# Patient Record
Sex: FEMALE | Race: WHITE | NOT HISPANIC OR LATINO | URBAN - METROPOLITAN AREA
[De-identification: names, ages, dates, MRNs, and addresses within clinical notes are randomized per-mention and may not be internally consistent; named-entity substitution may affect disease eponyms.]

---

## 2021-04-08 ENCOUNTER — OFFICE VISIT (OUTPATIENT)
Dept: URBAN - METROPOLITAN AREA CLINIC 78 | Facility: CLINIC | Age: 31
End: 2021-04-08
Payer: COMMERCIAL

## 2021-04-08 VITALS
SYSTOLIC BLOOD PRESSURE: 127 MMHG | BODY MASS INDEX: 43.72 KG/M2 | WEIGHT: 262.4 LBS | TEMPERATURE: 97.3 F | HEIGHT: 65 IN | DIASTOLIC BLOOD PRESSURE: 87 MMHG | HEART RATE: 79 BPM

## 2021-04-08 DIAGNOSIS — R19.7 DIARRHEA OF PRESUMED INFECTIOUS ORIGIN: ICD-10-CM

## 2021-04-08 DIAGNOSIS — R14.3 EXCESSIVE FLATUS: ICD-10-CM

## 2021-04-08 DIAGNOSIS — R10.11 RUQ PAIN: ICD-10-CM

## 2021-04-08 DIAGNOSIS — Z83.79 FAMILY HISTORY OF ULCERATIVE COLITIS: ICD-10-CM

## 2021-04-08 DIAGNOSIS — R14.0 BLOATING: ICD-10-CM

## 2021-04-08 PROCEDURE — G9903 PT SCRN TBCO ID AS NON USER: HCPCS | Performed by: INTERNAL MEDICINE

## 2021-04-08 PROCEDURE — 99214 OFFICE O/P EST MOD 30 MIN: CPT | Performed by: INTERNAL MEDICINE

## 2021-04-08 PROCEDURE — G8417 CALC BMI ABV UP PARAM F/U: HCPCS | Performed by: INTERNAL MEDICINE

## 2021-04-08 PROCEDURE — G9622 NO UNHEAL ETOH USER: HCPCS | Performed by: INTERNAL MEDICINE

## 2021-04-08 PROCEDURE — 3017F COLORECTAL CA SCREEN DOC REV: CPT | Performed by: INTERNAL MEDICINE

## 2021-04-08 PROCEDURE — 1036F TOBACCO NON-USER: CPT | Performed by: INTERNAL MEDICINE

## 2021-04-08 PROCEDURE — G8427 DOCREV CUR MEDS BY ELIG CLIN: HCPCS | Performed by: INTERNAL MEDICINE

## 2021-04-08 RX ORDER — MEDROXYPROGESTERONE ACETATE 10 MG/1
TABLET ORAL
Qty: 0 | Refills: 0 | Status: ACTIVE | COMMUNITY
Start: 1900-01-01

## 2021-04-08 NOTE — HPI-TODAY'S VISIT:
31-year-old  female presents for 1 month history of right upper quadrant pain/cramping, belching, excessive flatus, diarrhea 5-10 times per day, intermittent small amount of blood, postprandial, BSs type VII, and nausea and weight loss of 5 pounds.  Work-up done with the primary care provider, unremarkable but leukocytosis.  Pantoprazole and hyoscyamine started 3 days ago.  Hyoscyamine helps but short burst only.  She has been on Ozempic since February.  She has history of lactose intolerance, had colonoscopy at age of 16.  She is a nurse.

## 2021-04-08 NOTE — PREVIOUS WORKUP REVIEWED
:LABS-Labs 04/06/2021: WBC 14.3, hemoglobin 15.9, plt 303, glucose 88, BUN 9, creatinine 0.73, total protein 8.2, albumin 4.5, total bilirubin 0.9, alkaline phosphatase 97, AST 20, ALT 27, hemoglobin A1c 5.0, TSH 2.0, free T4 1.26, lipase 28.

## 2021-04-08 NOTE — HPI-TODAY'S VISIT:
Patient was referred by Dr. Kelly Tsnag for abdominal pain. A copy of this document will be sent to the physician.

## 2021-04-15 ENCOUNTER — TELEPHONE ENCOUNTER (OUTPATIENT)
Dept: URBAN - METROPOLITAN AREA CLINIC 23 | Facility: CLINIC | Age: 31
End: 2021-04-15

## 2021-04-18 ENCOUNTER — TELEPHONE ENCOUNTER (OUTPATIENT)
Dept: URBAN - METROPOLITAN AREA CLINIC 23 | Facility: CLINIC | Age: 31
End: 2021-04-18

## 2021-04-18 RX ORDER — METRONIDAZOLE 500 MG/1
1 TABLET TABLET, FILM COATED ORAL THREE TIMES A DAY
Qty: 30 | OUTPATIENT
Start: 2021-04-18 | End: 2021-04-28

## 2021-04-19 LAB — GASTROINTESTINAL PATHOGEN: (no result)

## 2021-04-21 ENCOUNTER — LAB OUTSIDE AN ENCOUNTER (OUTPATIENT)
Dept: URBAN - METROPOLITAN AREA CLINIC 78 | Facility: CLINIC | Age: 31
End: 2021-04-21

## 2022-09-22 ENCOUNTER — WEB ENCOUNTER (OUTPATIENT)
Dept: URBAN - METROPOLITAN AREA CLINIC 80 | Facility: CLINIC | Age: 32
End: 2022-09-22

## 2022-09-27 ENCOUNTER — OFFICE VISIT (OUTPATIENT)
Dept: URBAN - METROPOLITAN AREA CLINIC 80 | Facility: CLINIC | Age: 32
End: 2022-09-27
Payer: COMMERCIAL

## 2022-09-27 ENCOUNTER — DASHBOARD ENCOUNTERS (OUTPATIENT)
Age: 32
End: 2022-09-27

## 2022-09-27 VITALS
WEIGHT: 274.4 LBS | TEMPERATURE: 97 F | DIASTOLIC BLOOD PRESSURE: 102 MMHG | HEART RATE: 92 BPM | BODY MASS INDEX: 45.72 KG/M2 | HEIGHT: 65 IN | SYSTOLIC BLOOD PRESSURE: 147 MMHG

## 2022-09-27 DIAGNOSIS — K58.0 IRRITABLE BOWEL SYNDROME WITH DIARRHEA: ICD-10-CM

## 2022-09-27 DIAGNOSIS — R10.9 ABDOMINAL CRAMPING: ICD-10-CM

## 2022-09-27 DIAGNOSIS — R10.11 RUQ PAIN: ICD-10-CM

## 2022-09-27 DIAGNOSIS — R19.7 DIARRHEA, UNSPECIFIED TYPE: ICD-10-CM

## 2022-09-27 DIAGNOSIS — R14.0 BLOATING: ICD-10-CM

## 2022-09-27 PROBLEM — 197125005: Status: ACTIVE | Noted: 2022-09-27

## 2022-09-27 PROCEDURE — 99213 OFFICE O/P EST LOW 20 MIN: CPT | Performed by: PHYSICIAN ASSISTANT

## 2022-09-27 RX ORDER — HYOSCYAMINE SULFATE 0.12 MG/1
1 TABLET UNDER THE TONGUE AND ALLOW TO DISSOLVE  AS NEEDED TABLET, ORALLY DISINTEGRATING ORAL
Qty: 90 | Refills: 6 | OUTPATIENT
Start: 2022-09-27 | End: 2023-04-25

## 2022-09-27 RX ORDER — MEDROXYPROGESTERONE ACETATE 10 MG/1
TABLET ORAL
Qty: 0 | Refills: 0 | Status: ACTIVE | COMMUNITY
Start: 1900-01-01

## 2022-09-27 RX ORDER — RIFAXIMIN 550 MG/1
1 TABLET TABLET ORAL THREE TIMES A DAY
Qty: 42 TABLET | Refills: 3 | OUTPATIENT
Start: 2022-09-27 | End: 2022-11-21

## 2022-09-27 NOTE — HPI-TODAY'S VISIT:
She is wanting to get started with IVF again and her doctor wants to put her on Metformin Issue is anything she eats she has an upset stomach - has been for the past 1 1/2 years She has diarrhea soon after eating and if they eat out she can barely make it home She is having 5+ bm a day No BRBPR or melena Always loose and watery abd cramping before BM and relieved with the BM Rare nausea, no emesis - more car sick than anything else No family hx IBD, colon ca or polyps No change when she came off Ozempic in the past she had a colonoscopy when she was 16 - told she had IBS she does notice it worse with greasy foods, but recently it is with anythig she eats Also increased bloating and gas

## 2022-09-29 ENCOUNTER — P2P PATIENT RECORD (OUTPATIENT)
Age: 32
End: 2022-09-29

## 2022-10-12 ENCOUNTER — ERX REFILL RESPONSE (OUTPATIENT)
Dept: URBAN - METROPOLITAN AREA CLINIC 80 | Facility: CLINIC | Age: 32
End: 2022-10-12

## 2022-10-12 RX ORDER — HYOSCYAMINE SULFATE 0.12 MG/1
1 TABLET UNDER THE TONGUE AND ALLOW TO DISSOLVE AS NEEDED SUBLINGUAL BEFORE EACH MEAL 30 DAYS TABLET ORAL; SUBLINGUAL
Qty: 270 TABLET | Refills: 3 | OUTPATIENT

## 2022-10-12 RX ORDER — HYOSCYAMINE SULFATE 0.12 MG/1
1 TABLET UNDER THE TONGUE AND ALLOW TO DISSOLVE  AS NEEDED TABLET, ORALLY DISINTEGRATING ORAL
Qty: 90 | Refills: 6 | OUTPATIENT

## 2025-05-13 ENCOUNTER — HOSPITAL ENCOUNTER (EMERGENCY)
Facility: HOSPITAL | Age: 35
Discharge: HOME | End: 2025-05-13
Attending: EMERGENCY MEDICINE | Admitting: EMERGENCY MEDICINE
Payer: COMMERCIAL

## 2025-05-13 VITALS
WEIGHT: 222.13 LBS | TEMPERATURE: 99 F | DIASTOLIC BLOOD PRESSURE: 92 MMHG | RESPIRATION RATE: 17 BRPM | HEART RATE: 87 BPM | OXYGEN SATURATION: 100 % | SYSTOLIC BLOOD PRESSURE: 143 MMHG

## 2025-05-13 DIAGNOSIS — O46.90 VAGINAL BLEEDING IN PREGNANCY: Primary | ICD-10-CM

## 2025-05-13 DIAGNOSIS — N93.9 VAGINAL BLEEDING: ICD-10-CM

## 2025-05-13 LAB
ABORH RETYPE: NORMAL
ABSOLUTE EOSINOPHIL (OHS): 0.14 K/UL
ABSOLUTE MONOCYTE (OHS): 0.5 K/UL (ref 0.3–1)
ABSOLUTE NEUTROPHIL COUNT (OHS): 5.73 K/UL (ref 1.8–7.7)
ALBUMIN SERPL BCP-MCNC: 3.2 G/DL (ref 3.5–5.2)
ALP SERPL-CCNC: 85 UNIT/L (ref 40–150)
ALT SERPL W/O P-5'-P-CCNC: 18 UNIT/L (ref 10–44)
ANION GAP (OHS): 10 MMOL/L (ref 8–16)
AST SERPL-CCNC: 17 UNIT/L (ref 11–45)
BASOPHILS # BLD AUTO: 0.03 K/UL
BASOPHILS NFR BLD AUTO: 0.3 %
BILIRUB SERPL-MCNC: 1.2 MG/DL (ref 0.1–1)
BILIRUB UR QL STRIP.AUTO: NEGATIVE
BUN SERPL-MCNC: 9 MG/DL (ref 6–20)
CALCIUM SERPL-MCNC: 9 MG/DL (ref 8.7–10.5)
CHLORIDE SERPL-SCNC: 107 MMOL/L (ref 95–110)
CLARITY UR: CLEAR
CO2 SERPL-SCNC: 21 MMOL/L (ref 23–29)
COLOR UR AUTO: YELLOW
CREAT SERPL-MCNC: 0.7 MG/DL (ref 0.5–1.4)
ERYTHROCYTE [DISTWIDTH] IN BLOOD BY AUTOMATED COUNT: 12.7 % (ref 11.5–14.5)
GFR SERPLBLD CREATININE-BSD FMLA CKD-EPI: >60 ML/MIN/1.73/M2
GLUCOSE SERPL-MCNC: 94 MG/DL (ref 70–110)
GLUCOSE UR QL STRIP: NEGATIVE
HCT VFR BLD AUTO: 40.9 % (ref 37–48.5)
HGB BLD-MCNC: 13.8 GM/DL (ref 12–16)
HGB UR QL STRIP: ABNORMAL
HOLD SPECIMEN: NORMAL
IMM GRANULOCYTES # BLD AUTO: 0.02 K/UL (ref 0–0.04)
IMM GRANULOCYTES NFR BLD AUTO: 0.2 % (ref 0–0.5)
INDIRECT COOMBS: NORMAL
KETONES UR QL STRIP: ABNORMAL
LEUKOCYTE ESTERASE UR QL STRIP: NEGATIVE
LYMPHOCYTES # BLD AUTO: 2.79 K/UL (ref 1–4.8)
MCH RBC QN AUTO: 28.5 PG (ref 27–31)
MCHC RBC AUTO-ENTMCNC: 33.7 G/DL (ref 32–36)
MCV RBC AUTO: 85 FL (ref 82–98)
NITRITE UR QL STRIP: NEGATIVE
NUCLEATED RBC (/100WBC) (OHS): 0 /100 WBC
PH UR STRIP: 7 [PH]
PLATELET # BLD AUTO: 272 K/UL (ref 150–450)
PMV BLD AUTO: 9.8 FL (ref 9.2–12.9)
POTASSIUM SERPL-SCNC: 4.2 MMOL/L (ref 3.5–5.1)
PROT SERPL-MCNC: 7.2 GM/DL (ref 6–8.4)
PROT UR QL STRIP: NEGATIVE
RBC # BLD AUTO: 4.84 M/UL (ref 4–5.4)
RELATIVE EOSINOPHIL (OHS): 1.5 %
RELATIVE LYMPHOCYTE (OHS): 30.3 % (ref 18–48)
RELATIVE MONOCYTE (OHS): 5.4 % (ref 4–15)
RELATIVE NEUTROPHIL (OHS): 62.3 % (ref 38–73)
RH BLD: NORMAL
RH IMMUNE GLOBULIN: NORMAL
SODIUM SERPL-SCNC: 138 MMOL/L (ref 136–145)
SP GR UR STRIP: 1.02
SPECIMEN OUTDATE: NORMAL
UROBILINOGEN UR STRIP-ACNC: NEGATIVE EU/DL
WBC # BLD AUTO: 9.21 K/UL (ref 3.9–12.7)

## 2025-05-13 PROCEDURE — 84460 ALANINE AMINO (ALT) (SGPT): CPT | Performed by: EMERGENCY MEDICINE

## 2025-05-13 PROCEDURE — 63600519 RHOGAM PHARM REV CODE 636 ALT 250 W HCPCS: Performed by: REGISTERED NURSE

## 2025-05-13 PROCEDURE — 85025 COMPLETE CBC W/AUTO DIFF WBC: CPT | Performed by: EMERGENCY MEDICINE

## 2025-05-13 PROCEDURE — 99284 EMERGENCY DEPT VISIT MOD MDM: CPT | Mod: 25

## 2025-05-13 PROCEDURE — 96372 THER/PROPH/DIAG INJ SC/IM: CPT | Performed by: REGISTERED NURSE

## 2025-05-13 PROCEDURE — 81003 URINALYSIS AUTO W/O SCOPE: CPT | Performed by: EMERGENCY MEDICINE

## 2025-05-13 PROCEDURE — 86900 BLOOD TYPING SEROLOGIC ABO: CPT | Performed by: REGISTERED NURSE

## 2025-05-13 RX ORDER — NAPROXEN SODIUM 220 MG/1
81 TABLET, FILM COATED ORAL DAILY
COMMUNITY

## 2025-05-13 RX ORDER — ESTRADIOL 2 MG/1
2 TABLET ORAL 2 TIMES DAILY
COMMUNITY

## 2025-05-13 RX ORDER — ENOXAPARIN SODIUM 150 MG/ML
40 INJECTION SUBCUTANEOUS NIGHTLY
COMMUNITY

## 2025-05-13 RX ADMIN — HUMAN RHO(D) IMMUNE GLOBULIN 300 MCG: 300 INJECTION, SOLUTION INTRAMUSCULAR at 08:05

## 2025-05-13 NOTE — ED PROVIDER NOTES
"SCRIBE #1 NOTE: I, Juju Bouchra, am scribing for, and in the presence of, Jesus Talbert MD. I have scribed the entire note.       History     Chief Complaint   Patient presents with    Vaginal Bleeding     Pt is 6 weeks pregnant after IVF. Had vaguinal ultrasound yesterday in [place] then travelled to [place]. Last night filled half a pad with bright blood and a few clots. Today is brown and minimal. IVF dr was contacted by pt who advised she come for rogam shot. Pt denies pain. On lovenox as part of ivf process.      Review of patient's allergies indicates:  No Known Allergies      History of Present Illness     HPI    5/13/2025, 5:21 PM***  History obtained from the {Historian:55928::"patient","medical records"}      History of Present Illness: Jennyfer Barraza is a 35 y.o. female patient with {PMHx:80951::"a PMHx of"} *** who presents to the Emergency Department for evaluation of *** which began ***. Symptoms are constant*** and moderate*** in severity. No mitigating or exacerbating factors reported***. Associated sxs include ***. Patient denies any *** or ***. {Prior Tx:12514} No further complaints or concerns at this time.       Arrival mode: {Transportation list:40703}    PCP: No, Primary Doctor        Past Medical History:  History reviewed. No pertinent past medical history.    Past Surgical History:  History reviewed. No pertinent surgical history.      Family History:  No family history on file.    Social History:  Social History     Tobacco Use    Smoking status: Never    Smokeless tobacco: Never   Substance and Sexual Activity    Alcohol use: Not on file    Drug use: Not on file    Sexual activity: Not on file        Review of Systems     Review of Systems  ***   Physical Exam     Initial Vitals [05/13/25 1352]   BP Pulse Resp Temp SpO2   (!) 153/87 85 16 98 °F (36.7 °C) 98 %      MAP       --          Physical Exam  Nursing Notes and Vital Signs Reviewed.  Constitutional: Patient is in " "{DISTRESS LEVEL:84040}. {Nourishment:74542::"Well-developed and well-nourished"}.  Head: Atraumatic***. Normocephalic***.  Eyes: PERRL.*** EOM intact. Conjunctivae are not pale.*** No scleral icterus.***  ENT: Mucous membranes are {MM:28674}. Oropharynx is clear*** and symmetric***.    Neck: Supple.*** Full ROM.*** No lymphadenopathy.***  Cardiovascular: Regular rate***. Regular rhythm***. No murmurs, rubs, or gallops.*** Distal pulses are 2+ and symmetric***.  Pulmonary/Chest: No*** respiratory distress. Clear to auscultation bilaterally***. No wheezing*** or rales.***  Abdominal: Soft and non-distended.***  There is no tenderness***.  No rebound, guarding, or rigidity. Good bowel sounds.  Genitourinary: No*** CVA tenderness  Musculoskeletal: Moves all extremities.*** No obvious deformities***. No edema***. No calf tenderness***.  Skin: Warm and dry.***  Neurological:  Alert***, awake***, and appropriate***.  Normal speech***.  No acute focal neurological deficits are appreciated.***  Psychiatric: Normal affect.*** Good eye contact. Appropriate*** in content.     ED Course   Procedures  ED Vital Signs:  Vitals:    05/13/25 1352 05/13/25 1400 05/13/25 1415 05/13/25 1430   BP: (!) 153/87 (!) 141/86 116/82 117/82   Pulse: 85 91 82 82   Resp: 16      Temp: 98 °F (36.7 °C)      TempSrc: Oral      SpO2: 98% 98% 98% 98%   Weight: 100.8 kg (222 lb 1.8 oz)       05/13/25 1712   BP: (!) 154/104   Pulse: 86   Resp: 16   Temp: 98.5 °F (36.9 °C)   TempSrc: Oral   SpO2: 100%   Weight:        Abnormal Lab Results:  Labs Reviewed   URINALYSIS, REFLEX TO URINE CULTURE - Abnormal       Result Value    Color, UA Yellow      Appearance, UA Clear      pH, UA 7.0      Spec Grav UA 1.020      Protein, UA Negative      Glucose, UA Negative      Ketones, UA Trace (*)     Bilirubin, UA Negative      Blood, UA Trace (*)     Nitrites, UA Negative      Urobilinogen, UA Negative      Leukocyte Esterase, UA Negative     COMPREHENSIVE METABOLIC " PANEL - Abnormal    Sodium 138      Potassium 4.2      Chloride 107      CO2 21 (*)     Glucose 94      BUN 9      Creatinine 0.7      Calcium 9.0      Protein Total 7.2      Albumin 3.2 (*)     Bilirubin Total 1.2 (*)     ALP 85      AST 17      ALT 18      Anion Gap 10      eGFR >60     CBC WITH DIFFERENTIAL - Normal    WBC 9.21      RBC 4.84      HGB 13.8      HCT 40.9      MCV 85      MCH 28.5      MCHC 33.7      RDW 12.7      Platelet Count 272      MPV 9.8      Nucleated RBC 0      Neut % 62.3      Lymph % 30.3      Mono % 5.4      Eos % 1.5      Basophil % 0.3      Imm Grans % 0.2      Neut # 5.73      Lymph # 2.79      Mono # 0.50      Eos # 0.14      Baso # 0.03      Imm Grans # 0.02     CBC W/ AUTO DIFFERENTIAL    Narrative:     The following orders were created for panel order CBC auto differential.  Procedure                               Abnormality         Status                     ---------                               -----------         ------                     CBC with Differential[[id number]]       Normal              Final result                 Please view results for these tests on the individual orders.   GREY TOP URINE HOLD    Extra Tube Hold for add-ons.     PREPARE RH IMMUNE GLOBULIN        All Lab Results:  {LABS TODAY:60083}    Imaging Results:  Imaging Results    None          The EKG was ordered, reviewed, and independently interpreted by the ED provider.  Interpretation time: ***  Rate: *** BPM  Rhythm: {rhythm:18606}  Interpretation: ***. No STEMI.  When compared to EKG performed ***, there are no significant changes.           The Emergency Provider reviewed the vital signs and test results, which are outlined above.     ED Discussion     {PLAN:79883}       Medical Decision Making  Amount and/or Complexity of Data Reviewed  Labs: ordered.                ED Medication(s):  Medications - No data to display    New Prescriptions    No medications on file        Follow-up Information        [facility]. Go today.    Contact information:  [address] [facility] - Emergency Dept.    Specialty: Emergency Medicine  Contact information:  [address]  Emergency Department  [facility] [address] [phone]                               Scribe Attestation:   Scribe #1: I performed the above scribed service and the documentation accurately describes the services I performed. I attest to the accuracy of the note.     Attending:   Physician Attestation Statement for Scribe #1: I, Jesus Talbert MD, personally performed the services described in this documentation, as scribed by Juju Shook, in my presence, and it is both accurate and complete.           Clinical Impression       ICD-10-CM ICD-9-CM   1. Vaginal bleeding in pregnancy  O46.90 641.93        ***

## 2025-05-13 NOTE — ED NOTES
Patient in Ed c/o vaginal bleeding (bright red with clots on yesterday day). Spotting today per patient.  Patient states she gets IVIF and takes Lovenox shots daily. Last dose was at 19:00 hours on yesterday.      Sent here by IVIF Physician for a Rhogam injection.      5 weeks and six days pregnant.

## 2025-05-13 NOTE — ED PROVIDER NOTES
SCRIBE #1 NOTE: I, Juju Shook, am scribing for, and in the presence of, Jesus Talbert MD. I have scribed the entire note.       History     Chief Complaint   Patient presents with    Vaginal Bleeding     Pt is 6 weeks pregnant after IVF. Had vaguinal ultrasound yesterday in [place] then travelled to [place]. Last night filled half a pad with bright blood and a few clots. Today is brown and minimal. IVF dr was contacted by pt who advised she come for rogam shot. Pt denies pain. On lovenox as part of ivf process.      Review of patient's allergies indicates:  No Known Allergies      History of Present Illness     HPI    5/13/2025, 6:54 PM  History obtained from the patient, medical records, and mother      History of Present Illness: Jennyfer Barraza is a 35 y.o. female patient with no previous PMHx reported who presents to the Emergency Department at the instruction of her IVF specialist to receive RhoGAM injection. Her mother is at bedside assisting in providing pt history. Patient was discharged from [facility] ED earlier today after evaluation for vaginal bleeding. Patient is currently pregnant after multiple rounds of IVF. She is not in any pain or discomfort at this time. Prior Tx includes Lovenox as part of her IVF treatment. No further complaints or concerns at this time.       Arrival mode: Personal Transportation    PCP: Lulu, Primary Doctor        Past Medical History:  History reviewed. No pertinent past medical history.    Past Surgical History:  History reviewed. No pertinent surgical history.      Family History:  No family history on file.    Social History:  Social History     Tobacco Use    Smoking status: Never    Smokeless tobacco: Never   Substance and Sexual Activity    Alcohol use: Not on file    Drug use: Not on file    Sexual activity: Not on file        Review of Systems     Review of Systems   Constitutional:  Negative for fever.   HENT:  Negative for sore throat.    Respiratory:   Negative for shortness of breath.    Cardiovascular:  Negative for chest pain.   Gastrointestinal:  Negative for abdominal pain and nausea.   Genitourinary:  Positive for vaginal bleeding (improved). Negative for dysuria and vaginal pain.   Musculoskeletal:  Negative for back pain.   Skin:  Negative for rash.   Neurological:  Negative for weakness.   Hematological:  Does not bruise/bleed easily.   All other systems reviewed and are negative.     Physical Exam     Initial Vitals [05/13/25 1352]   BP Pulse Resp Temp SpO2   (!) 153/87 85 16 98 °F (36.7 °C) 98 %      MAP       --          Physical Exam  Nursing Notes and Vital Signs Reviewed.  Constitutional: Patient is in no acute distress. Well-developed and well-nourished.  Head: Atraumatic. Normocephalic.  Eyes: PERRL. EOM intact. Conjunctivae are not pale. No scleral icterus.  ENT: Mucous membranes are moist. Oropharynx is clear and symmetric.    Neck: Supple. Full ROM. No lymphadenopathy.  Cardiovascular: Regular rate. Regular rhythm. No murmurs, rubs, or gallops. Distal pulses are 2+ and symmetric.  Pulmonary/Chest: No respiratory distress. Clear to auscultation bilaterally. No wheezing or rales.  Abdominal: Soft and non-distended.  There is no tenderness.  No rebound, guarding, or rigidity. Good bowel sounds.  Genitourinary: No CVA tenderness  Musculoskeletal: Moves all extremities. No obvious deformities. No edema. No calf tenderness.  Skin: Warm and dry.  Neurological:  Alert, awake, and appropriate.  Normal speech.  No acute focal neurological deficits are appreciated.  Psychiatric: Normal affect. Good eye contact. Appropriate in content.     ED Course   Procedures  ED Vital Signs:  Vitals:    05/13/25 1352 05/13/25 1400 05/13/25 1415 05/13/25 1430   BP: (!) 153/87 (!) 141/86 116/82 117/82   Pulse: 85 91 82 82   Resp: 16      Temp: 98 °F (36.7 °C)      TempSrc: Oral      SpO2: 98% 98% 98% 98%   Weight: 100.8 kg (222 lb 1.8 oz)       05/13/25 1712 05/13/25  1815 05/13/25 1830 05/13/25 1850   BP: (!) 154/104 (!) 141/92 (!) 135/90 (!) 136/98   Pulse: 86 90 86 80   Resp: 16 20 18 18   Temp: 98.5 °F (36.9 °C)      TempSrc: Oral      SpO2: 100% 100% 100% 100%   Weight:        05/13/25 1900   BP: (!) 134/96   Pulse: 91   Resp: 20   Temp: 98.5 °F (36.9 °C)   TempSrc: Oral   SpO2: 100%   Weight:        Abnormal Lab Results:  Labs Reviewed   URINALYSIS, REFLEX TO URINE CULTURE - Abnormal       Result Value    Color, UA Yellow      Appearance, UA Clear      pH, UA 7.0      Spec Grav UA 1.020      Protein, UA Negative      Glucose, UA Negative      Ketones, UA Trace (*)     Bilirubin, UA Negative      Blood, UA Trace (*)     Nitrites, UA Negative      Urobilinogen, UA Negative      Leukocyte Esterase, UA Negative     COMPREHENSIVE METABOLIC PANEL - Abnormal    Sodium 138      Potassium 4.2      Chloride 107      CO2 21 (*)     Glucose 94      BUN 9      Creatinine 0.7      Calcium 9.0      Protein Total 7.2      Albumin 3.2 (*)     Bilirubin Total 1.2 (*)     ALP 85      AST 17      ALT 18      Anion Gap 10      eGFR >60     CBC WITH DIFFERENTIAL - Normal    WBC 9.21      RBC 4.84      HGB 13.8      HCT 40.9      MCV 85      MCH 28.5      MCHC 33.7      RDW 12.7      Platelet Count 272      MPV 9.8      Nucleated RBC 0      Neut % 62.3      Lymph % 30.3      Mono % 5.4      Eos % 1.5      Basophil % 0.3      Imm Grans % 0.2      Neut # 5.73      Lymph # 2.79      Mono # 0.50      Eos # 0.14      Baso # 0.03      Imm Grans # 0.02     CBC W/ AUTO DIFFERENTIAL    Narrative:     The following orders were created for panel order CBC auto differential.  Procedure                               Abnormality         Status                     ---------                               -----------         ------                     CBC with Differential[[id number]]       Normal              Final result                 Please view results for these tests on the individual orders.   GREY TOP  URINE HOLD    Extra Tube Hold for add-ons.     TYPE & SCREEN    Specimen Outdate 05/16/2025 23:59      Group & Rh A NEG      Indirect Paty NEG     ABORH RETYPE    ABORH Retype A NEG     PREPARE RH IMMUNE GLOBULIN    Rh Immune Fili [id number] Issued          All Lab Results:  Results for orders placed or performed during the hospital encounter of 05/13/25   Prepare Rh Immune Globulin    Collection Time: 05/13/25  2:47 PM   Result Value Ref Range    Rh Immune Susygulin [id number] Issued    Comprehensive metabolic panel    Collection Time: 05/13/25  3:07 PM   Result Value Ref Range    Sodium 138 136 - 145 mmol/L    Potassium 4.2 3.5 - 5.1 mmol/L    Chloride 107 95 - 110 mmol/L    CO2 21 (L) 23 - 29 mmol/L    Glucose 94 70 - 110 mg/dL    BUN 9 6 - 20 mg/dL    Creatinine 0.7 0.5 - 1.4 mg/dL    Calcium 9.0 8.7 - 10.5 mg/dL    Protein Total 7.2 6.0 - 8.4 gm/dL    Albumin 3.2 (L) 3.5 - 5.2 g/dL    Bilirubin Total 1.2 (H) 0.1 - 1.0 mg/dL    ALP 85 40 - 150 unit/L    AST 17 11 - 45 unit/L    ALT 18 10 - 44 unit/L    Anion Gap 10 8 - 16 mmol/L    eGFR >60 >60 mL/min/1.73/m2   CBC with Differential    Collection Time: 05/13/25  3:07 PM   Result Value Ref Range    WBC 9.21 3.90 - 12.70 K/uL    RBC 4.84 4.00 - 5.40 M/uL    HGB 13.8 12.0 - 16.0 gm/dL    HCT 40.9 37.0 - 48.5 %    MCV 85 82 - 98 fL    MCH 28.5 27.0 - 31.0 pg    MCHC 33.7 32.0 - 36.0 g/dL    RDW 12.7 11.5 - 14.5 %    Platelet Count 272 150 - 450 K/uL    MPV 9.8 9.2 - 12.9 fL    Nucleated RBC 0 <=0 /100 WBC    Neut % 62.3 38 - 73 %    Lymph % 30.3 18 - 48 %    Mono % 5.4 4 - 15 %    Eos % 1.5 <=8 %    Basophil % 0.3 <=1.9 %    Imm Grans % 0.2 0.0 - 0.5 %    Neut # 5.73 1.8 - 7.7 K/uL    Lymph # 2.79 1 - 4.8 K/uL    Mono # 0.50 0.3 - 1 K/uL    Eos # 0.14 <=0.5 K/uL    Baso # 0.03 <=0.2 K/uL    Imm Grans # 0.02 0.00 - 0.04 K/uL   Urinalysis, Reflex to Urine Culture Urine, Clean Catch    Collection Time: 05/13/25  3:11 PM    Specimen: Urine   Result Value Ref Range     Color, UA Yellow Straw, Rosita, Yellow, Light-Orange    Appearance, UA Clear Clear    pH, UA 7.0 5.0 - 8.0    Spec Grav UA 1.020 1.005 - 1.030    Protein, UA Negative Negative    Glucose, UA Negative Negative    Ketones, UA Trace (A) Negative    Bilirubin, UA Negative Negative    Blood, UA Trace (A) Negative    Nitrites, UA Negative Negative    Urobilinogen, UA Negative <2.0 EU/dL    Leukocyte Esterase, UA Negative Negative   GREY TOP URINE HOLD    Collection Time: 05/13/25  3:11 PM   Result Value Ref Range    Extra Tube Hold for add-ons.    ABORH RETYPE    Collection Time: 05/13/25  6:15 PM   Result Value Ref Range    ABORH Retype A NEG    Type & Screen    Collection Time: 05/13/25  6:19 PM   Result Value Ref Range    Specimen Outdate 05/16/2025 23:59     Group & Rh A NEG     Indirect Paty NEG        Imaging Results:  Imaging Results    None              The Emergency Provider reviewed the vital signs and test results, which are outlined above.     ED Discussion     8:09 PM: Reassessed pt at this time. Discussed with patient and/or family/caretaker all pertinent ED information and results. Discussed pt dx and plan of tx. Gave the patient and family all f/u and return to the ED instructions. All questions and concerns were addressed at this time. Patient and/or family/caretaker expresses understanding of information and instructions, and is comfortable with plan to discharge. Pt is stable for discharge.     I discussed with patient and/or family/caretaker that evaluation in the ED does not suggest any emergent or life threatening medical conditions requiring immediate intervention beyond what was provided in the ED, and I believe patient is safe for discharge.  Regardless, an unremarkable evaluation in the ED does not preclude the development or presence of a serious of life threatening condition. As such, I instructed that the patient is to return immediately for any worsening or change in current symptoms.        Medical Decision Making  Amount and/or Complexity of Data Reviewed  Labs: ordered. Decision-making details documented in ED Course.                ED Medication(s):  Medications   rho(D) immune globulin (RhoGAM/HyperRHO) IM injection (300 mcg Intramuscular Given 5/13/25 2028)       Current Discharge Medication List           Follow-up Information       [facility]. Go today.    Contact information:  [address]             [facility] - Emergency Dept.    Specialty: Emergency Medicine  Contact information:  [address]  Emergency Department  [facility] [address] [phone]                               Scribe Attestation:   Scribe #1: I performed the above scribed service and the documentation accurately describes the services I performed. I attest to the accuracy of the note.     Attending:   Physician Attestation Statement for Scribe #1: I, Jesus Talbert MD, personally performed the services described in this documentation, as scribed by Juju Shook, in my presence, and it is both accurate and complete.           Clinical Impression       ICD-10-CM ICD-9-CM   1. Vaginal bleeding in pregnancy  O46.90 641.93   2. Vaginal bleeding  N93.9 623.8       Disposition:   Disposition: Discharged  Condition: Stable

## 2025-05-13 NOTE — ED NOTES
Bed: Int 24  Expected date:   Expected time:   Means of arrival:   Comments:   Thalidomide Pregnancy And Lactation Text: This medication is Pregnancy Category X and is absolutely contraindicated during pregnancy. It is unknown if it is excreted in breast milk.

## 2025-05-13 NOTE — ED NOTES
Patient traveling by Private Vehicle to [facility] for an ED to ED transfer.  Dr. Arriaga has accepted patient, and she is in route.

## 2025-05-13 NOTE — ED PROVIDER NOTES
Encounter Date: 5/13/2025       History     Chief Complaint   Patient presents with    Vaginal Bleeding     Pt is 6 weeks pregnant after IVF. Had vaguinal ultrasound yesterday in [place] then travelled to [place]. Last night filled half a pad with bright blood and a few clots. Today is brown and minimal. IVF  was contacted by pt who advised she come for rogam shot. Pt denies pain. On lovenox as part of ivf process.      36yo F here in [facility] who is 5wga and had some vaginal bleeding/spotting yesterday after vaginal u/s in [place] and pt states she's rh negative but  is rh positive. pt requesting rhogam shot and ok with going ed to ed to get it. spotting has improved and pt denies any pain. We do not have rhogam here at this facility.    The history is provided by the patient.   Vaginal Bleeding  This is a new problem. The current episode started yesterday. The problem occurs rarely. The problem has been resolved. Pertinent negatives include no chest pain, no abdominal pain, no headaches and no shortness of breath. Nothing (pt ahd vaginal u/s yesterday and on lovenox) aggravates the symptoms. She has tried nothing for the symptoms. The treatment provided significant relief.     Review of patient's allergies indicates:  No Known Allergies  History reviewed. No pertinent past medical history.  History reviewed. No pertinent surgical history.  No family history on file.  Social History[1]  Review of Systems   Constitutional:  Negative for fever.   HENT:  Negative for sore throat.    Respiratory:  Negative for shortness of breath.    Cardiovascular:  Negative for chest pain.   Gastrointestinal:  Negative for abdominal pain and nausea.   Genitourinary:  Positive for vaginal bleeding. Negative for dysuria.   Musculoskeletal:  Negative for back pain.   Skin:  Negative for rash.   Neurological:  Negative for weakness and headaches.   Hematological:  Does not bruise/bleed easily.   All other systems reviewed  and are negative.      Physical Exam     Initial Vitals [05/13/25 1352]   BP Pulse Resp Temp SpO2   (!) 153/87 85 16 98 °F (36.7 °C) 98 %      MAP       --         Physical Exam    Nursing note and vitals reviewed.  Constitutional: She appears well-developed and well-nourished.   HENT:   Head: Normocephalic and atraumatic. Mouth/Throat: No oropharyngeal exudate.   Eyes: Conjunctivae and EOM are normal. Pupils are equal, round, and reactive to light.   Neck: Neck supple. No thyromegaly present.   Normal range of motion.  Cardiovascular:  Normal rate, regular rhythm, normal heart sounds and intact distal pulses.     Exam reveals no gallop and no friction rub.       No murmur heard.  Pulmonary/Chest: Breath sounds normal. No respiratory distress. She has no wheezes. She has no rhonchi. She exhibits no tenderness.   Abdominal: Abdomen is soft. Bowel sounds are normal. She exhibits no distension. There is no abdominal tenderness. There is no rebound and no guarding.   Genitourinary:    Genitourinary Comments: Pt declined vaginal exam at this point in time and states bleeding has resolved.     Musculoskeletal:         General: No tenderness or edema. Normal range of motion.      Cervical back: Normal range of motion and neck supple.     Lymphadenopathy:     She has no cervical adenopathy.   Neurological: She is alert and oriented to person, place, and time. She has normal strength. No cranial nerve deficit or sensory deficit.   Skin: Skin is warm and dry. Capillary refill takes less than 2 seconds. No rash noted.   Psychiatric: She has a normal mood and affect. Her behavior is normal. Judgment and thought content normal.         ED Course   Procedures  Labs Reviewed   URINALYSIS, REFLEX TO URINE CULTURE   CBC W/ AUTO DIFFERENTIAL    Narrative:     The following orders were created for panel order CBC auto differential.  Procedure                               Abnormality         Status                     ---------                                -----------         ------                     CBC with Differential[[id number]]                                                        Please view results for these tests on the individual orders.   COMPREHENSIVE METABOLIC PANEL   CBC WITH DIFFERENTIAL   PREPARE RH IMMUNE GLOBULIN          Imaging Results    None          Medications - No data to display  Medical Decision Making  Amount and/or Complexity of Data Reviewed  Labs: ordered. Decision-making details documented in ED Course.    Risk  OTC drugs.  Prescription drug management.  Parenteral controlled substances.  Risk Details: OTC drugs, prescription drugs and controlled substances considered.  Due to patient's symptoms improving and pain controlled pain medications ordered appropriately.  Differential diagnosis:  Menorrhagia dysfunctional uterine bleeding, threatened miscarriage, subchorionic bleed, Fibroids, polyps, adenomyosis, AV malformation, endometritis, PID, vaginal discharge, endocrine disorder, coagulopathy, cancer or mass among others.                    Vitals:    05/13/25 1352 05/13/25 1400 05/13/25 1430   BP: (!) 153/87 (!) 141/86 117/82   Pulse: 85 91 82   Resp: 16     Temp: 98 °F (36.7 °C)     TempSrc: Oral     SpO2: 98% 98% 98%   Weight: 100.8 kg (222 lb 1.8 oz)         No results found for this or any previous visit.      Imaging Results    None         Medications - No data to display    3:05 PM - Re-evaluation: The patient is resting comfortably and is in no acute distress.  Pt going to [facility] for further evaluation and treatment of vaginal bleeding. Pt will be going POV  ED to ED.      Regarding VAGINAL BLEEDING, I explained to patient that a small amount of bleeding (spotting) from the vagina is relatively common in pregnancy and that the bleeding usually stops on its own. I discussed possible causes of bleeding or spotting in pregnancy such as: infection or inflammation of the cervix; growths (polyps) on  the cervix; miscarriage or threatened miscarriage; pregnancy tissue has developed outside of the uterus and in a fallopian tube (tubal pregnancy); and/or the development of tiny cysts in the uterus instead of pregnancy tissue (molar pregnancy).  For home care, I instructed patient to: watch condition for any changes; follow health care provider's instructions for limiting activity (i.e., if bed rest  was ordered, stay in bed and only get up to use the bathroom); keep track of the number of pads used each day, how often pads are changed, and how soaked (saturated) they are; avoid using tampons and do not douche; do not have sexual intercourse or orgasms until approved by health care provider; if tissue is passed from vagina, save the tissue and show it to obstetrician/gynecologist; only take over-the-counter or prescription medicines as directed; avoid taking aspirin because it can increase bleeding; and keep all follow-up appointments as directed by health care provider.  Contact and follow up with obstetrician/gynecologist if bleeding increases, severe abdominal cramps develop,  large clots or tissue from expel from vagina, and for any further questions or concerns regarding vaginal bleeding and the treatment plan prescribed.         Jennyfer Barraza was given a handout which discussed their disease process, precautions, and instructions for follow-up and therapy.     Follow-up Information       [facility]. Go today.    Contact information:  [address] [facility] - Emergency Dept.    Specialty: Emergency Medicine  Contact information:  [address]  Emergency Department  [facility] [address] [phone]                              Medication List        ASK your doctor about these medications      aspirin 81 MG Chew     enoxaparin 150 mg/mL Syrg  Commonly known as: LOVENOX     estradioL 2 MG tablet  Commonly known as: ESTRACE     METANX FC ORAL              Current Discharge Medication List            ED Diagnosis  1. Vaginal bleeding in pregnancy             .: Patient refused recommended mode of transport, explained increased risk.              Clinical Impression:  Final diagnoses:  [O46.90] Vaginal bleeding in pregnancy (Primary)          ED Disposition Condition    ED to ED Transfer Stable                    [1]   Social History  Tobacco Use    Smoking status: Never    Smokeless tobacco: Never        Luciano Ospina Jr., MD  05/13/25 2787